# Patient Record
Sex: MALE | Race: WHITE | HISPANIC OR LATINO | URBAN - METROPOLITAN AREA
[De-identification: names, ages, dates, MRNs, and addresses within clinical notes are randomized per-mention and may not be internally consistent; named-entity substitution may affect disease eponyms.]

---

## 2018-02-03 ENCOUNTER — EMERGENCY (EMERGENCY)
Facility: HOSPITAL | Age: 33
LOS: 1 days | Discharge: HOME | End: 2018-02-03
Attending: EMERGENCY MEDICINE | Admitting: EMERGENCY MEDICINE
Payer: COMMERCIAL

## 2018-02-03 VITALS — DIASTOLIC BLOOD PRESSURE: 90 MMHG | SYSTOLIC BLOOD PRESSURE: 126 MMHG | HEART RATE: 80 BPM | OXYGEN SATURATION: 100 %

## 2018-02-03 VITALS
RESPIRATION RATE: 16 BRPM | OXYGEN SATURATION: 98 % | HEART RATE: 67 BPM | DIASTOLIC BLOOD PRESSURE: 71 MMHG | TEMPERATURE: 98 F | SYSTOLIC BLOOD PRESSURE: 129 MMHG

## 2018-02-03 PROCEDURE — 99284 EMERGENCY DEPT VISIT MOD MDM: CPT

## 2018-02-03 PROCEDURE — 99283 EMERGENCY DEPT VISIT LOW MDM: CPT

## 2018-02-03 RX ORDER — ACETAMINOPHEN 500 MG
650 TABLET ORAL ONCE
Qty: 0 | Refills: 0 | Status: COMPLETED | OUTPATIENT
Start: 2018-02-03 | End: 2018-02-03

## 2018-02-03 RX ORDER — DIAZEPAM 5 MG
5 TABLET ORAL ONCE
Qty: 0 | Refills: 0 | Status: DISCONTINUED | OUTPATIENT
Start: 2018-02-03 | End: 2018-02-03

## 2018-02-03 RX ORDER — DIAZEPAM 5 MG
1 TABLET ORAL
Qty: 5 | Refills: 0 | OUTPATIENT
Start: 2018-02-03 | End: 2018-02-05

## 2018-02-03 RX ADMIN — Medication 650 MILLIGRAM(S): at 10:31

## 2018-02-03 RX ADMIN — Medication 5 MILLIGRAM(S): at 10:31

## 2018-02-03 NOTE — ED PROVIDER NOTE - NS ED ROS FT
See hpi CONST: see HPI  : See HPI  NEURO: see HPI  GI: see HPI  CV: HPI  RESP: see HPI  MSK: see HPI

## 2018-02-03 NOTE — ED PROVIDER NOTE - MUSCULOSKELETAL MINIMAL EXAM
normal range of motion/lower back paraspinal muscle spasm, no midline bony tendernss. Normal straight leg raise. Normal strength of dorsi and plantarflexion/atraumatic

## 2018-02-03 NOTE — ED ADULT NURSE NOTE - OBJECTIVE STATEMENT
31 y/o M, A&Ox4, enters ED w/ c/o lower back pain. Hx. sciatica. Pt. reports he was getting out of bed and felt a sharp pain. Pt. reports since then he is unable to ambulate without pain. Pt. reports pain is 10/10 when ambulating. Pt. reports pain is primarily in right lower back and radiates down right leg. Pt. states pain is sharp. Denies falls/trauma to the area but pt. does report heavy lifting at work. Pt. reports taking 800 mg of ibuprofen earlier w/o relief. Skin warm, dry and intact. Family at bedside. Safety and comfort provided. 31 y/o M, A&Ox4, enters ED w/ c/o lower back pain. Hx. sciatica. Pt. reports he was getting out of bed and felt a sharp pain. Pt. reports since then he is unable to ambulate without pain. Pt. reports pain is 10/10 when ambulating. Pt. reports pain is primarily in right lower back and radiates down right leg. Pt. states pain is sharp. Denies falls/trauma to the area but pt. does report heavy lifting at work. Pt.'s pain increased when bending knee. Pt. reports taking 800 mg of ibuprofen earlier w/o relief. Skin warm, dry and intact. Family at bedside. Safety and comfort provided.

## 2018-02-03 NOTE — ED PROVIDER NOTE - OBJECTIVE STATEMENT
33yo male pmh GERD, sciatica p/w back pain limiting ambulation. Started last night after standing up off couch. Worse with movement. Took 800mg of "something" at 745 this morning. C/o right sided sciatica. Denies fevers, chills, trauma, n/v/d, chest pain, dyspnea, cough, abdominal pain, dysuria, urinary or fecal incontinence, weight changes, night sweats, IVDU, cancer hx, saddle anesthesia, dysuria, hematuria.  PCP - Dr Polo  [facility] [address] [facility] 31yo male pmh GERD, sciatica p/w back pain limiting ambulation simialr to but worse than baseline sciatica. Started last night after standing up off couch. Worse with movement. Took 800mg of "something" at 745 this morning. Pain similar to historical right sided sciatica. Difficulty ambulating is due to pain but he is able to ambulate. Denies fevers, chills, trauma, n/v/d, chest pain, dyspnea, cough, abdominal pain, dysuria, urinary or fecal incontinence, weight changes, night sweats, IVDU, cancer hx, saddle anesthesia or other sensory changes, muscular weakness, dysuria, hematuria.  PCP - Dr Polo  [facility] [address] [facility] 33yo male pmh GERD, sciatica p/w back pain limiting ambulation similar to but worse than baseline sciatica. Started last night after standing up off couch. Worse with movement. Took 800mg of "something" at 745 this morning. Pain similar to historical right sided sciatica. Difficulty ambulating is due to pain but he is able to ambulate. Denies fevers, chills, trauma, n/v/d, chest pain, dyspnea, cough, abdominal pain, dysuria, urinary or fecal incontinence, weight changes, night sweats, IVDU, cancer hx, saddle anesthesia or other sensory changes, muscular weakness, dysuria, hematuria.  PCP - Dr Polo  [facility] [address] [facility]

## 2018-02-03 NOTE — ED ADULT NURSE REASSESSMENT NOTE - NS ED NURSE REASSESS COMMENT FT1
pt. reports significant relief of pain. Pt. able to ambulate and reports a remarkable difference in being able to ambulate. Pt. reports prior to  medication, he could not ambulate at all.

## 2018-02-03 NOTE — ED PROVIDER NOTE - ATTENDING CONTRIBUTION TO CARE
ATTENDING MD:  Donte PONCE, personally have seen and examined this patient.  I have discussed all aspects of care with the resident physician. Resident note reviewed and agree on plan of care and except where noted.  See HPI, PE, and MDM for details.     GEN: well appearing, mild distress from pain  HEENT: NCAT, mucus membranes are moist, oropharynx is within normal limits   CV: normal rate, regular rhythm, S1, S2, 2+ DP and PT bilaterally  RESP: lungs clear to auscultation bilaterally, equal breath sounds bilaterally  ABD: the abdomen is soft, nontender, and nondistended, there are no palpable masses or organomegaly   : no CVAT  MSK: no midline back tenderness, mild R>L muscualr tenderness, no appreciable spasm, negative straight leg raise bilaterally  NEURO: 5/5 strength in all extremities, sensation intact to light touch throughout trunk and extremities, 2+ patellar reflexes    MDM: sciatic type back pain after heavy lifting, no red flags. in contrast to earlier notes, pt HAS been able to ambualte, it just hurts. supportive care, reeval. no indication for imaging. will refer to spine center for f/u.

## 2018-02-03 NOTE — ED PROVIDER NOTE - PROGRESS NOTE DETAILS
Ambulating, pt feels better. Will prescribe valium and ibuprofen. Discussed return precautions. Provided orthopedic contact for follow up

## 2019-03-31 NOTE — ED PROVIDER NOTE - PRINCIPAL DIAGNOSIS
[facility] 
CARDIAC CATH Name:  Cecily Lo 
MR#:  [id number] :  1946 ACCOUNT #:  [de-identified] DATE OF SERVICE:  2019 PROCEDURE PERFORMED:  Cardiac catheterization. HISTORY:  This is a 79-year-old gentleman with multiple risk factors for coronary artery disease. He has been having chest pain suspicious for unstable angina pectoris. Noninvasive imaging showed evidence of three-vessel coronary artery calcification. A cardiac catheterization is recommended. SURGEON:  Desi Elena MD 
 
PROCEDURE:  Left heart catheterization with left ventriculography and coronary angiography was carried out from the right radial artery by modified Seldinger technique with a 6-Italian multipurpose catheter and 3.5XB. The patient tolerated the procedure well. FINDINGS:  The central aortic pressure was 120/70 mmHg. Left ventricular end-diastolic pressure was 12 mmHg. There is no gradient on pullback across the aortic valve. The overall left ventricular size is normal.  The wall motion is normal.  The ejection fraction is 65%. Coronary angiography reveals a normal left main. It divides into an LAD and left circumflex. The LAD and lcx are normal. 
 
The right coronary artery is normal 
 
Imp: 
 
Normal LV function Normal coronaries DICTATION ENDS HERE. Emi Schmidt MD 
 
 
[id number] 
D:  2019 18:31 T:  2019 18:32 
JOB #:  [id number] Acute bilateral low back pain with right-sided sciatica

## 2020-08-17 PROBLEM — M54.31 SCIATICA, RIGHT SIDE: Chronic | Status: ACTIVE | Noted: 2018-02-03

## 2020-08-17 PROBLEM — K21.9 GASTRO-ESOPHAGEAL REFLUX DISEASE WITHOUT ESOPHAGITIS: Chronic | Status: ACTIVE | Noted: 2018-02-03

## 2020-08-26 ENCOUNTER — NON-APPOINTMENT (OUTPATIENT)
Age: 35
End: 2020-08-26

## 2020-08-26 ENCOUNTER — APPOINTMENT (OUTPATIENT)
Dept: INTERNAL MEDICINE | Facility: CLINIC | Age: 35
End: 2020-08-26
Payer: COMMERCIAL

## 2020-08-26 VITALS
TEMPERATURE: 98 F | DIASTOLIC BLOOD PRESSURE: 86 MMHG | BODY MASS INDEX: 39.38 KG/M2 | HEIGHT: 69.5 IN | HEART RATE: 91 BPM | WEIGHT: 272 LBS | SYSTOLIC BLOOD PRESSURE: 145 MMHG

## 2020-08-26 DIAGNOSIS — K21.0 GASTRO-ESOPHAGEAL REFLUX DISEASE WITH ESOPHAGITIS: ICD-10-CM

## 2020-08-26 DIAGNOSIS — Z78.9 OTHER SPECIFIED HEALTH STATUS: ICD-10-CM

## 2020-08-26 DIAGNOSIS — R06.02 SHORTNESS OF BREATH: ICD-10-CM

## 2020-08-26 DIAGNOSIS — Z82.49 FAMILY HISTORY OF ISCHEMIC HEART DISEASE AND OTHER DISEASES OF THE CIRCULATORY SYSTEM: ICD-10-CM

## 2020-08-26 DIAGNOSIS — Z87.891 PERSONAL HISTORY OF NICOTINE DEPENDENCE: ICD-10-CM

## 2020-08-26 DIAGNOSIS — Z11.59 ENCOUNTER FOR SCREENING FOR OTHER VIRAL DISEASES: ICD-10-CM

## 2020-08-26 DIAGNOSIS — K58.9 IRRITABLE BOWEL SYNDROME W/OUT DIARRHEA: ICD-10-CM

## 2020-08-26 DIAGNOSIS — K21.9 GASTRO-ESOPHAGEAL REFLUX DISEASE W/OUT ESOPHAGITIS: ICD-10-CM

## 2020-08-26 PROCEDURE — 99385 PREV VISIT NEW AGE 18-39: CPT | Mod: 25

## 2020-08-26 PROCEDURE — 93000 ELECTROCARDIOGRAM COMPLETE: CPT

## 2020-08-26 PROCEDURE — 36415 COLL VENOUS BLD VENIPUNCTURE: CPT

## 2020-08-26 NOTE — HEALTH RISK ASSESSMENT
[] : No [Yes] : Yes [2 - 4 times a month (2 pts)] : 2-4 times a month (2 points) [Never (0 pts)] : Never (0 points) [3 or 4 (1 pt)] : 3 or 4  (1 point) [No] : In the past 12 months have you used drugs other than those required for medical reasons? No [0] : 2) Feeling down, depressed, or hopeless: Not at all (0) [YearQuit] : quit 2 months ago  [Audit-CScore] : 3 [de-identified] : better but works at night therefore sometimes not good  [de-identified] : starting to with wife.  rides bike and walking  [No Retinopathy] : No retinopathy [[id number]] : 0 [EyeExamDate] : 7/2020 [Employed] : employed [] :  [# Of Children ___] : has [unfilled] children [Sexually Active] : sexually active [High Risk Behavior] : no high risk behavior [Fully functional (bathing, dressing, toileting, transferring, walking, feeding)] : Fully functional (bathing, dressing, toileting, transferring, walking, feeding) [Fully functional (using the telephone, shopping, preparing meals, housekeeping, doing laundry, using] : Fully functional and needs no help or supervision to perform IADLs (using the telephone, shopping, preparing meals, housekeeping, doing laundry, using transportation, managing medications and managing finances) [Reports changes in hearing] : Reports no changes in hearing [Reports changes in vision] : Reports no changes in vision [Reports changes in dental health] : Reports no changes in dental health [FreeTextEntry3] : trying to conceive

## 2020-08-26 NOTE — ASSESSMENT
[FreeTextEntry1] : 34 year old  male with h/o obesity, fatty liver, former smoker, esophageal ulcers presents for initial annual exam.\par \par states recently has been feeling some chest pain and sob while playing sports.  never during his bike rides or walking.  resolves after several minutes of rest.  no radiation to left arm or neck.  no prior episodes in the past.  former smoker and decreased alcohol consumption.  ecg-normal.  given symptoms and increased CV risk advised to followup with cardiology for further evaluation, advised to hold off on strenuous exercise for now, if symptoms worsen to o directly to ED \par \par \par h/o esophageal ulcers in the past.  s/p EGD 2 years ago.  was on PPI for a while then stopped.  \par h/o IBS, better after diatary changes\par -gi referral \par -advised to decrease alcohol and dietary modifications\par \par Annual \par -Advised to get yearly Flu shot \par -Advised Yearly Eye exam with Ophthalmologist\par -Advised Yearly Dental exam\par -Educated of the importance of Healthy diet, such as Mediterranean Diet and Exercise, such as walking >20 minutes a day and increasing gradually as tolerated\par \par \par \par \par \par \par \par

## 2020-08-26 NOTE — PHYSICAL EXAM
[Normal] : normal rate, regular rhythm, normal S1 and S2 and no murmur heard [No Varicosities] : no varicosities [Pedal Pulses Present] : the pedal pulses are present [No Edema] : there was no peripheral edema [Soft] : abdomen soft [No Extremity Clubbing/Cyanosis] : no extremity clubbing/cyanosis [Non Tender] : non-tender [Non-distended] : non-distended [No Masses] : no abdominal mass palpated [Normal Bowel Sounds] : normal bowel sounds [Normal Posterior Cervical Nodes] : no posterior cervical lymphadenopathy [Normal Anterior Cervical Nodes] : no anterior cervical lymphadenopathy [No CVA Tenderness] : no CVA  tenderness [No Spinal Tenderness] : no spinal tenderness [No Joint Swelling] : no joint swelling [Grossly Normal Strength/Tone] : grossly normal strength/tone [No Rash] : no rash [No Focal Deficits] : no focal deficits [Coordination Grossly Intact] : coordination grossly intact [Normal Gait] : normal gait [de-identified] : overweight  [Normal Affect] : the affect was normal [Normal Mood] : the mood was normal

## 2020-08-26 NOTE — HISTORY OF PRESENT ILLNESS
[de-identified] : 34 year old  male presents for initial annual exam.\par \par states recently has been feeling some chest pain and sob while playing sports.  never during his bike rides or walking.  resolves after several minutes of rest.  no radiation to left arm or neck.  no prior episodes in the past \par \par h/o esophageal ulcers in the past.  s/p EGD 2 years ago.  was on PPI for a while then stopped.  \par h/o IBS, better after diatary changes\par \par former smoker and decreased alcohol consumption.  \par \par

## 2020-08-26 NOTE — REVIEW OF SYSTEMS
[Constipation] : constipation [Chest Pain] : chest pain [Diarrhea] : diarrhea [Negative] : Heme/Lymph [FreeTextEntry7] : bloating, cramping

## 2020-08-27 LAB
25(OH)D3 SERPL-MCNC: 22 NG/ML
ABO + RH PNL BLD: NORMAL
ALBUMIN SERPL ELPH-MCNC: 4.6 G/DL
ALP BLD-CCNC: 111 U/L
ALT SERPL-CCNC: 73 U/L
ANION GAP SERPL CALC-SCNC: 12 MMOL/L
APPEARANCE: CLEAR
AST SERPL-CCNC: 46 U/L
BACTERIA: NEGATIVE
BASOPHILS # BLD AUTO: 0.04 K/UL
BASOPHILS NFR BLD AUTO: 0.4 %
BILIRUB DIRECT SERPL-MCNC: 0.2 MG/DL
BILIRUB INDIRECT SERPL-MCNC: 0.6 MG/DL
BILIRUB SERPL-MCNC: 0.9 MG/DL
BILIRUBIN URINE: NEGATIVE
BLOOD URINE: NEGATIVE
BUN SERPL-MCNC: 17 MG/DL
CALCIUM SERPL-MCNC: 9.2 MG/DL
CHLORIDE SERPL-SCNC: 104 MMOL/L
CHOLEST SERPL-MCNC: 142 MG/DL
CHOLEST/HDLC SERPL: 4.5 RATIO
CO2 SERPL-SCNC: 20 MMOL/L
COLOR: NORMAL
CREAT SERPL-MCNC: 0.89 MG/DL
EOSINOPHIL # BLD AUTO: 0.15 K/UL
EOSINOPHIL NFR BLD AUTO: 1.5 %
ESTIMATED AVERAGE GLUCOSE: 258 MG/DL
FERRITIN SERPL-MCNC: 346 NG/ML
FOLATE SERPL-MCNC: 15.9 NG/ML
GLUCOSE QUALITATIVE U: ABNORMAL
GLUCOSE SERPL-MCNC: 338 MG/DL
HBA1C MFR BLD HPLC: 10.6 %
HCT VFR BLD CALC: 42.4 %
HDLC SERPL-MCNC: 32 MG/DL
HGB BLD-MCNC: 12.8 G/DL
HYALINE CASTS: 0 /LPF
IMM GRANULOCYTES NFR BLD AUTO: 0.6 %
IRON SATN MFR SERPL: 21 %
IRON SERPL-MCNC: 67 UG/DL
KETONES URINE: NORMAL
LDLC SERPL CALC-MCNC: 79 MG/DL
LEUKOCYTE ESTERASE URINE: NEGATIVE
LYMPHOCYTES # BLD AUTO: 2.62 K/UL
LYMPHOCYTES NFR BLD AUTO: 25.4 %
MAN DIFF?: NORMAL
MCHC RBC-ENTMCNC: 23.4 PG
MCHC RBC-ENTMCNC: 30.2 GM/DL
MCV RBC AUTO: 77.5 FL
MICROSCOPIC-UA: NORMAL
MONOCYTES # BLD AUTO: 0.78 K/UL
MONOCYTES NFR BLD AUTO: 7.6 %
NEUTROPHILS # BLD AUTO: 6.65 K/UL
NEUTROPHILS NFR BLD AUTO: 64.5 %
NITRITE URINE: NEGATIVE
PH URINE: 6
PLATELET # BLD AUTO: 244 K/UL
POTASSIUM SERPL-SCNC: 4.1 MMOL/L
PROT SERPL-MCNC: 7.1 G/DL
PROTEIN URINE: NEGATIVE
RBC # BLD: 5.47 M/UL
RBC # FLD: 15.5 %
RED BLOOD CELLS URINE: 1 /HPF
SARS-COV-2 IGG SERPL IA-ACNC: 3.82 AU/ML
SARS-COV-2 IGG SERPL QL IA: NEGATIVE
SAVE SPECIMEN: NORMAL
SODIUM SERPL-SCNC: 136 MMOL/L
SPECIFIC GRAVITY URINE: 1.04
SQUAMOUS EPITHELIAL CELLS: 0 /HPF
TIBC SERPL-MCNC: 313 UG/DL
TRIGL SERPL-MCNC: 158 MG/DL
TSH SERPL-ACNC: 1.46 UIU/ML
UIBC SERPL-MCNC: 246 UG/DL
UROBILINOGEN URINE: NORMAL
VIT B12 SERPL-MCNC: 660 PG/ML
WBC # FLD AUTO: 10.3 K/UL
WHITE BLOOD CELLS URINE: 1 /HPF

## 2020-08-28 ENCOUNTER — APPOINTMENT (OUTPATIENT)
Dept: INTERNAL MEDICINE | Facility: CLINIC | Age: 35
End: 2020-08-28
Payer: COMMERCIAL

## 2020-08-28 VITALS
WEIGHT: 272 LBS | HEIGHT: 69.5 IN | TEMPERATURE: 98.2 F | SYSTOLIC BLOOD PRESSURE: 118 MMHG | HEART RATE: 66 BPM | BODY MASS INDEX: 39.38 KG/M2 | DIASTOLIC BLOOD PRESSURE: 77 MMHG

## 2020-08-28 LAB — GLUCOSE BLDC GLUCOMTR-MCNC: 217

## 2020-08-28 PROCEDURE — 82962 GLUCOSE BLOOD TEST: CPT

## 2020-08-28 PROCEDURE — 99213 OFFICE O/P EST LOW 20 MIN: CPT | Mod: 25

## 2020-08-28 RX ORDER — ASCORBIC ACID 1000 MG
10 TABLET ORAL
Refills: 0 | Status: ACTIVE | COMMUNITY

## 2020-08-28 RX ORDER — CRISABOROLE 20 MG/G
2 OINTMENT TOPICAL
Qty: 60 | Refills: 0 | Status: ACTIVE | COMMUNITY
Start: 2020-08-25

## 2020-08-28 RX ORDER — BLOOD-GLUCOSE METER
KIT MISCELLANEOUS
Qty: 1 | Refills: 0 | Status: ACTIVE | COMMUNITY
Start: 2020-08-28 | End: 1900-01-01

## 2020-08-28 NOTE — PLAN
[FreeTextEntry1] : 34 year old obese male presents for follow-up of newly-dxed DM.  a1c-10.6\par discussed in length, nature of disease, need to get lower levels, and risks associated with elevated levels, including but not limited to premature death, cv event.  \par \par seen ophthalmologist last month- no retinopathy\par will schedule appt with cardio\par \par Diabetes Mellitus, uncontrolled\par -will start meds, risks and benefits of medication discussed in detail.\par -nutrition consult\par -advised will need to start checking BG with glucometer, also suggested freestyle sultana if insurance covers and patient preference\par -Gave patient education on what foods to eat and avoid, as well as exercises to incorporate into their daily routine.\par -Encourage healthy Mediterranean Diet and moderate exercise regularly\par -May visit ADA website for diet recommendations\par -Advised to check a fasting sugar level in the morning, 2 hours after ANY meal and prior to bedtime.\par  -Record levels and bring it to your next appointment along with glucometer to review\par When to call your Doctor:-Call your doctor if you have unexplained blood sugar of 200 mg/dl or greater for 2 days. -Call your doctor, if your blood sugar is higher than 300 mg/dl at any time.-Call your doctor, if your blood sugar is less than 70 mg/dl two times on the same day or three times within one week. Your doctor may want to adjust your medication. -If you have signs/symptoms of low blood sugar (hypoglycemia),and/or your blood sugar is less than 70 mg/dl you may choose one of the below treatment 3 glucose tablets or ½ glass (4 oz.) of apple juice or 1/2 glass (4 oz.) of clear regular soda \par \par -f/u in 2 weeks \par \par

## 2020-08-28 NOTE — HISTORY OF PRESENT ILLNESS
[Spouse] : spouse [de-identified] : 34 year old obese male presents for follow-up of newly-dxed DM.  a1c-10.6\par discussed in length, nature of disease, need to get lower levels, and risks associated with elevated levels, including but not limited to premature death, cv event.  \par \par seen ophthalmologist last month- no retinopathy\par will schedule appt with cardio

## 2020-08-28 NOTE — PHYSICAL EXAM
[Normal] : normal rate, regular rhythm, normal S1 and S2 and no murmur heard [Pedal Pulses Present] : the pedal pulses are present [No Edema] : there was no peripheral edema [Soft] : abdomen soft [Non Tender] : non-tender [Non-distended] : non-distended

## 2020-08-29 ENCOUNTER — TRANSCRIPTION ENCOUNTER (OUTPATIENT)
Age: 35
End: 2020-08-29

## 2020-09-01 ENCOUNTER — TRANSCRIPTION ENCOUNTER (OUTPATIENT)
Age: 35
End: 2020-09-01

## 2020-09-01 RX ORDER — METFORMIN HYDROCHLORIDE 1000 MG/1
1000 TABLET, COATED ORAL
Qty: 180 | Refills: 1 | Status: DISCONTINUED | COMMUNITY
Start: 2020-08-28 | End: 2020-09-01

## 2020-09-10 ENCOUNTER — APPOINTMENT (OUTPATIENT)
Dept: CARDIOLOGY | Facility: CLINIC | Age: 35
End: 2020-09-10
Payer: COMMERCIAL

## 2020-09-10 ENCOUNTER — NON-APPOINTMENT (OUTPATIENT)
Age: 35
End: 2020-09-10

## 2020-09-10 VITALS
WEIGHT: 272 LBS | HEART RATE: 76 BPM | OXYGEN SATURATION: 99 % | TEMPERATURE: 98 F | DIASTOLIC BLOOD PRESSURE: 76 MMHG | BODY MASS INDEX: 39.59 KG/M2 | SYSTOLIC BLOOD PRESSURE: 118 MMHG

## 2020-09-10 VITALS — SYSTOLIC BLOOD PRESSURE: 120 MMHG | DIASTOLIC BLOOD PRESSURE: 80 MMHG

## 2020-09-10 PROCEDURE — 99204 OFFICE O/P NEW MOD 45 MIN: CPT

## 2020-09-10 PROCEDURE — 93000 ELECTROCARDIOGRAM COMPLETE: CPT

## 2020-09-16 ENCOUNTER — APPOINTMENT (OUTPATIENT)
Dept: CARDIOLOGY | Facility: CLINIC | Age: 35
End: 2020-09-16
Payer: COMMERCIAL

## 2020-09-16 ENCOUNTER — APPOINTMENT (OUTPATIENT)
Dept: INTERNAL MEDICINE | Facility: CLINIC | Age: 35
End: 2020-09-16
Payer: COMMERCIAL

## 2020-09-16 VITALS
BODY MASS INDEX: 38.51 KG/M2 | SYSTOLIC BLOOD PRESSURE: 123 MMHG | HEART RATE: 62 BPM | WEIGHT: 266 LBS | TEMPERATURE: 98.2 F | DIASTOLIC BLOOD PRESSURE: 72 MMHG | HEIGHT: 69.5 IN

## 2020-09-16 LAB — GLUCOSE BLDC GLUCOMTR-MCNC: 146

## 2020-09-16 PROCEDURE — 82962 GLUCOSE BLOOD TEST: CPT

## 2020-09-16 PROCEDURE — 99214 OFFICE O/P EST MOD 30 MIN: CPT

## 2020-09-16 PROCEDURE — 93015 CV STRESS TEST SUPVJ I&R: CPT

## 2020-09-16 NOTE — ASSESSMENT
[FreeTextEntry1] : 34 year old obese male presents for follow-up of newly-dxed DM.  a1c-10.6\par discussed in length, nature of disease, need to get lower levels, and risks associated with elevated levels, including but not limited to premature death, cv event.  \par \par seen ophthalmologist last month- no retinopathy\par seen cardio, stress test scheduled for today \par \par BGM now -147, hasn’t eaten anything since last night \par \par DM, last a1c-10.  stopped taking metformin due to diarrhea for one day.  working to get ER approved \par +weight loss but has started a more ketosis diet with increased protein intake \par -advised to go on ADA website for dietary recs, meet with DM educator (calls when he is sleeping due to his schedule, has appt this friday)\par -advised to restart metformin, advised SE are usually short lived.  still working on PA\par -will follow-up with cardio \par -cont statin and acei\par \par h/o esophageal ulcers in the past.  s/p EGD 2 years ago.  was on PPI for a while then stopped.  \par h/o IBS, better after dietary changes\par -gi referral \par -advised to decrease alcohol and dietary modifications\par \par -f/u in 2 months \par \par \par \par \par \par \par \par \par \par

## 2020-09-16 NOTE — HISTORY OF PRESENT ILLNESS
[de-identified] : 34 year old obese male presents for follow-up of newly-dxed DM.  a1c-10.6\par discussed in length, nature of disease, need to get lower levels, and risks associated with elevated levels, including but not limited to premature death, cv event.  \par \par seen ophthalmologist last month- no retinopathy\par seen cardio, stress test scheduled for today \par \par stopped taking metformin due to diarrhea for one day.  working to get ER approved \par +weight loss but has started a more ketosis diet with increased protein intake \par \par POCT BGM now -147, hasn’t eaten anything since last night \par \par BG at home 130-180, rare >200s\par

## 2020-09-18 NOTE — DISCUSSION/SUMMARY
[FreeTextEntry1] : He is a 35-year-old with poorly controlled diabetes who presents with fairly typical chest pain without any acute ECG changes.  His risk is at least intermediate given his risk factors and presentation and I am considering imaging his coronary arteries either with CT angiography or with coronary angiography.\par I suggested that he undergo an exercise stress test to both reproduce his symptoms and see if there are any high risk ECG features that would make coronary angiography more appropriate as both a diagnostic and possibly therapeutic intervention.\par If no significant abnormalities are seen on exercise stress testing then coronary CT angiography would be enough to exclude severe atherosclerosis.\par Better control of his blood sugar and statin therapy will likely be beneficial as you have started.  I have given him aspirin in the interim.

## 2020-09-18 NOTE — HISTORY OF PRESENT ILLNESS
[FreeTextEntry1] : Dear Anders,\par Thank you for referring him for cardiovascular evaluation.  He is a 34-year-old with newly diagnosed diabetes mellitus and hemoglobin A1c of 10.6 who was noted to have exertional chest discomfort particular when exerting himself.\par He states that over the past few months whenever he walks up hills or bicycle rides he gets chest discomfort.\par The discomfort lasts until he is done bicycle riding or slows down.  There are no other associated symptoms.\par He has no known history of coronary artery disease, hypertension, hypercholesterolemia or current smoking.\par He does have a history of esophageal ulcers.

## 2020-09-29 ENCOUNTER — APPOINTMENT (OUTPATIENT)
Dept: CARDIOLOGY | Facility: CLINIC | Age: 35
End: 2020-09-29
Payer: COMMERCIAL

## 2020-09-29 PROCEDURE — 93306 TTE W/DOPPLER COMPLETE: CPT

## 2020-09-30 ENCOUNTER — RESULT REVIEW (OUTPATIENT)
Age: 35
End: 2020-09-30

## 2020-09-30 ENCOUNTER — APPOINTMENT (OUTPATIENT)
Dept: CT IMAGING | Facility: CLINIC | Age: 35
End: 2020-09-30

## 2020-09-30 ENCOUNTER — OUTPATIENT (OUTPATIENT)
Dept: OUTPATIENT SERVICES | Facility: HOSPITAL | Age: 35
LOS: 1 days | End: 2020-09-30
Payer: SELF-PAY

## 2020-09-30 DIAGNOSIS — Z00.8 ENCOUNTER FOR OTHER GENERAL EXAMINATION: ICD-10-CM

## 2020-09-30 PROCEDURE — 75571 CT HRT W/O DYE W/CA TEST: CPT | Mod: 26

## 2020-09-30 PROCEDURE — 75571 CT HRT W/O DYE W/CA TEST: CPT

## 2020-10-02 ENCOUNTER — APPOINTMENT (OUTPATIENT)
Dept: CHRONIC DISEASE MANAGEMENT | Facility: CLINIC | Age: 35
End: 2020-10-02

## 2020-10-02 VITALS — HEIGHT: 69.5 IN | WEIGHT: 262 LBS | BODY MASS INDEX: 37.93 KG/M2

## 2020-10-02 RX ORDER — METFORMIN HYDROCHLORIDE 500 MG/1
500 TABLET, EXTENDED RELEASE ORAL
Qty: 180 | Refills: 0 | Status: DISCONTINUED | COMMUNITY
Start: 2020-09-01 | End: 2020-10-02

## 2020-11-18 ENCOUNTER — APPOINTMENT (OUTPATIENT)
Dept: INTERNAL MEDICINE | Facility: CLINIC | Age: 35
End: 2020-11-18

## 2021-10-11 ENCOUNTER — APPOINTMENT (OUTPATIENT)
Dept: INTERNAL MEDICINE | Facility: CLINIC | Age: 36
End: 2021-10-11
Payer: COMMERCIAL

## 2021-10-11 ENCOUNTER — NON-APPOINTMENT (OUTPATIENT)
Age: 36
End: 2021-10-11

## 2021-10-11 VITALS
TEMPERATURE: 97 F | HEIGHT: 69.5 IN | SYSTOLIC BLOOD PRESSURE: 134 MMHG | OXYGEN SATURATION: 97 % | DIASTOLIC BLOOD PRESSURE: 82 MMHG | HEART RATE: 80 BPM

## 2021-10-11 DIAGNOSIS — Z23 ENCOUNTER FOR IMMUNIZATION: ICD-10-CM

## 2021-10-11 DIAGNOSIS — Z00.00 ENCOUNTER FOR GENERAL ADULT MEDICAL EXAMINATION W/OUT ABNORMAL FINDINGS: ICD-10-CM

## 2021-10-11 DIAGNOSIS — K76.0 FATTY (CHANGE OF) LIVER, NOT ELSEWHERE CLASSIFIED: ICD-10-CM

## 2021-10-11 LAB — HBA1C MFR BLD HPLC: 11.7

## 2021-10-11 PROCEDURE — 90686 IIV4 VACC NO PRSV 0.5 ML IM: CPT

## 2021-10-11 PROCEDURE — G0008: CPT

## 2021-10-11 PROCEDURE — 99395 PREV VISIT EST AGE 18-39: CPT | Mod: 25

## 2021-10-11 PROCEDURE — 93000 ELECTROCARDIOGRAM COMPLETE: CPT

## 2021-10-11 PROCEDURE — 83036 HEMOGLOBIN GLYCOSYLATED A1C: CPT | Mod: QW

## 2021-10-11 PROCEDURE — 36415 COLL VENOUS BLD VENIPUNCTURE: CPT

## 2021-10-11 RX ORDER — ATORVASTATIN CALCIUM 20 MG/1
20 TABLET, FILM COATED ORAL
Qty: 90 | Refills: 1 | Status: ACTIVE | COMMUNITY
Start: 2020-08-28 | End: 1900-01-01

## 2021-10-11 RX ORDER — LISINOPRIL 2.5 MG/1
2.5 TABLET ORAL DAILY
Qty: 90 | Refills: 3 | Status: ACTIVE | COMMUNITY
Start: 2020-08-28 | End: 1900-01-01

## 2021-10-11 NOTE — HEALTH RISK ASSESSMENT
[Good] : ~his/her~  mood as  good [No] : No [No falls in past year] : Patient reported no falls in the past year [0] : 2) Feeling down, depressed, or hopeless: Not at all (0) [No Retinopathy] : No retinopathy [] : No [[id number]] : 0 [EyeExamDate] : 2020

## 2021-10-11 NOTE — HISTORY OF PRESENT ILLNESS
[de-identified] : 36 year old male with h/o uncontrolled DM, obesity, fatty liver  presents for annual exam.  hasn’t been seen since last year\par \par seen by cardio last year, stress, echo and CT calcium score-unremarkable \par \par Diabetes Mellitus, POCT A1c-11.7, has been off meds for the last 2 months \par BGM-hasnt been checking \par Ophthalmologist - 2020\par \par poor diet and not exercising recently.  increased alcohol use over last several months.  \par \par employed-  \par

## 2021-10-11 NOTE — PHYSICAL EXAM
[Normal] : normal rate, regular rhythm, normal S1 and S2 and no murmur heard [No Varicosities] : no varicosities [Pedal Pulses Present] : the pedal pulses are present [No Edema] : there was no peripheral edema [No Extremity Clubbing/Cyanosis] : no extremity clubbing/cyanosis [Soft] : abdomen soft [Non Tender] : non-tender [Non-distended] : non-distended [Comprehensive Foot Exam Normal] : Right and left foot were examined and both feet are normal. No ulcers in either foot. Toes are normal and with full ROM.  Normal tactile sensation with monofilament testing throughout both feet

## 2021-10-11 NOTE — ASSESSMENT
[FreeTextEntry1] : 36 year old male with h/o uncontrolled DM, obesity, fatty liver  presents for annual exam.  hasn’t been seen since last year\par seen by cardio last year, stress, echo and CT calcium score-unremarkable \par poor diet and not exercising recently.  increased alcohol use over last several months.  \par employed-  \par \par \par uncontrolled Diabetes Mellitus, POCT A1c-11.7, has been off meds for the last 2 months \par BGM-hasnt been checking \par Ophthalmologist - 2020\par -will restart meds, including metformin, will need to add but will titrate upwards as has been resistent in the past\par -atorvastatin for HLD \par -lisinopril for nephro protection \par -advised needs to decrease alcohol intake \par -Gave patient education on what foods to eat and avoid, as well as exercises to incorporate into their daily routine.\par -Encourage healthy Mediterranean Diet and moderate exercise regularly\par -May visit ADA website for diet recommendations\par -Advised to check a fasting sugar level in the morning, 2 hours after ANY meal and prior to bedtime.\par  -Record levels and bring it to your next appointment along with glucometer to review\par When to call your Doctor:-Call your doctor if you have unexplained blood sugar of 200 mg/dl or greater for 2 days. -Call your doctor, if your blood sugar is higher than 300 mg/dl at any time.-Call your doctor, if your blood sugar is less than 70 mg/dl two times on the same day or three times within one week. Your doctor may want to adjust your medication. -If you have signs/symptoms of low blood sugar (hypoglycemia),and/or your blood sugar is less than 70 mg/dl you may choose one of the below treatment 3 glucose tablets or ½ glass (4 oz.) of apple juice or 1/2 glass (4 oz.) of clear regular soda \par -Advised to follow-up with Ophthalmologist annually for diabetic retinopathy screening -needs to see this year\par -f/u in 1 month with readings \par \par h/o esophageal ulcers in the past.  s/p EGD 2 years ago.  was on PPI for a while then stopped.  +alcohol use \par h/o IBS, better after dietary changes\par -gi referral \par -advised to decrease alcohol and dietary modifications\par \par Annual \par -Advised to get yearly Flu shot -given today \par -Advised Yearly Eye exam with Ophthalmologist\par -Advised Yearly Dental exam\par -Educated of the importance of Healthy diet, such as Mediterranean Diet and Exercise, such as walking >20 minutes a day and increasing gradually as tolerated\par \par -covid -UTD\par \par \par -f/u in 1 month\par \par \par \par \par \par \par \par \par \par

## 2021-10-12 LAB
25(OH)D3 SERPL-MCNC: 20.4 NG/ML
ALBUMIN SERPL ELPH-MCNC: 4.3 G/DL
ALP BLD-CCNC: 116 U/L
ALT SERPL-CCNC: 54 U/L
ANION GAP SERPL CALC-SCNC: 12 MMOL/L
APPEARANCE: CLEAR
AST SERPL-CCNC: 28 U/L
BACTERIA: NEGATIVE
BASOPHILS # BLD AUTO: 0.05 K/UL
BASOPHILS NFR BLD AUTO: 0.5 %
BILIRUB DIRECT SERPL-MCNC: 0.1 MG/DL
BILIRUB INDIRECT SERPL-MCNC: 0.3 MG/DL
BILIRUB SERPL-MCNC: 0.4 MG/DL
BILIRUBIN URINE: NEGATIVE
BLOOD URINE: NEGATIVE
BUN SERPL-MCNC: 11 MG/DL
CALCIUM SERPL-MCNC: 9.3 MG/DL
CHLORIDE SERPL-SCNC: 104 MMOL/L
CHOLEST SERPL-MCNC: 143 MG/DL
CO2 SERPL-SCNC: 22 MMOL/L
COLOR: NORMAL
CREAT SERPL-MCNC: 0.88 MG/DL
CREAT SPEC-SCNC: 144 MG/DL
EOSINOPHIL # BLD AUTO: 0.1 K/UL
EOSINOPHIL NFR BLD AUTO: 1 %
ESTIMATED AVERAGE GLUCOSE: 269 MG/DL
FERRITIN SERPL-MCNC: 244 NG/ML
FOLATE SERPL-MCNC: 12.3 NG/ML
GLUCOSE QUALITATIVE U: ABNORMAL
GLUCOSE SERPL-MCNC: 322 MG/DL
HBA1C MFR BLD HPLC: 11 %
HCT VFR BLD CALC: 43.4 %
HDLC SERPL-MCNC: 33 MG/DL
HGB BLD-MCNC: 13 G/DL
HYALINE CASTS: 0 /LPF
IMM GRANULOCYTES NFR BLD AUTO: 0.5 %
IRON SATN MFR SERPL: 26 %
IRON SERPL-MCNC: 76 UG/DL
KETONES URINE: NORMAL
LDLC SERPL CALC-MCNC: 68 MG/DL
LEUKOCYTE ESTERASE URINE: NEGATIVE
LYMPHOCYTES # BLD AUTO: 3.06 K/UL
LYMPHOCYTES NFR BLD AUTO: 31.7 %
MAN DIFF?: NORMAL
MCHC RBC-ENTMCNC: 23.3 PG
MCHC RBC-ENTMCNC: 30 GM/DL
MCV RBC AUTO: 77.9 FL
MICROALBUMIN 24H UR DL<=1MG/L-MCNC: <1.2 MG/DL
MICROALBUMIN/CREAT 24H UR-RTO: NORMAL MG/G
MICROSCOPIC-UA: NORMAL
MONOCYTES # BLD AUTO: 0.72 K/UL
MONOCYTES NFR BLD AUTO: 7.5 %
NEUTROPHILS # BLD AUTO: 5.67 K/UL
NEUTROPHILS NFR BLD AUTO: 58.8 %
NITRITE URINE: NEGATIVE
NONHDLC SERPL-MCNC: 109 MG/DL
PH URINE: 6
PLATELET # BLD AUTO: 240 K/UL
POTASSIUM SERPL-SCNC: 4.1 MMOL/L
PROT SERPL-MCNC: 6.9 G/DL
PROTEIN URINE: NEGATIVE
RBC # BLD: 5.57 M/UL
RBC # FLD: 15.6 %
RED BLOOD CELLS URINE: 2 /HPF
SODIUM SERPL-SCNC: 138 MMOL/L
SPECIFIC GRAVITY URINE: 1.04
SQUAMOUS EPITHELIAL CELLS: 0 /HPF
TIBC SERPL-MCNC: 296 UG/DL
TRIGL SERPL-MCNC: 207 MG/DL
TSH SERPL-ACNC: 1.56 UIU/ML
UIBC SERPL-MCNC: 220 UG/DL
UROBILINOGEN URINE: NORMAL
VIT B12 SERPL-MCNC: 667 PG/ML
WBC # FLD AUTO: 9.65 K/UL
WHITE BLOOD CELLS URINE: 1 /HPF

## 2022-01-31 ENCOUNTER — APPOINTMENT (OUTPATIENT)
Dept: CARDIOLOGY | Facility: CLINIC | Age: 37
End: 2022-01-31

## 2022-02-04 ENCOUNTER — APPOINTMENT (OUTPATIENT)
Dept: CARDIOLOGY | Facility: CLINIC | Age: 37
End: 2022-02-04

## 2022-02-07 ENCOUNTER — APPOINTMENT (OUTPATIENT)
Dept: CARDIOLOGY | Facility: CLINIC | Age: 37
End: 2022-02-07
Payer: COMMERCIAL

## 2022-02-07 ENCOUNTER — NON-APPOINTMENT (OUTPATIENT)
Age: 37
End: 2022-02-07

## 2022-02-07 VITALS
HEIGHT: 69.5 IN | OXYGEN SATURATION: 96 % | WEIGHT: 273 LBS | DIASTOLIC BLOOD PRESSURE: 80 MMHG | HEART RATE: 103 BPM | BODY MASS INDEX: 39.53 KG/M2 | SYSTOLIC BLOOD PRESSURE: 132 MMHG

## 2022-02-07 DIAGNOSIS — E11.9 TYPE 2 DIABETES MELLITUS W/OUT COMPLICATIONS: ICD-10-CM

## 2022-02-07 DIAGNOSIS — R07.9 CHEST PAIN, UNSPECIFIED: ICD-10-CM

## 2022-02-07 PROCEDURE — 93000 ELECTROCARDIOGRAM COMPLETE: CPT

## 2022-02-07 PROCEDURE — 99214 OFFICE O/P EST MOD 30 MIN: CPT

## 2022-02-07 RX ORDER — PANTOPRAZOLE 40 MG/1
40 TABLET, DELAYED RELEASE ORAL
Qty: 90 | Refills: 1 | Status: ACTIVE | COMMUNITY
Start: 2022-02-07 | End: 1900-01-01

## 2022-02-13 NOTE — DISCUSSION/SUMMARY
[FreeTextEntry1] : He is a 36-year-old with poorly controlled diabetes, obesity and gastroesophageal reflux symptoms. \par \par In September 2020 he had a normal exercise stress test. \par His echocardiogram revealed normal left ventricular systolic function. \par He also had a coronary calcium scan with a calculated Agatston score of 0.\par His diabetes continues to be poorly controlled with a most recent A1c of 11.7%.\par We discussed the extreme importance in avoiding any type of fad dieting due to lack of sustainability toward weight loss goals and increased risk for negative outcomes.\par He will schedule an appointment to meet with our dietitian for further guidance and has been encouraged to assemble and use his new rowing machine for exercise with a goal of at least 20 minutes three times a week. PPI therapy should be helpful in suppressing his GI related chest discomfort, although dietary modifications are also necessary.  \par His chest pain does not appear anginal.\par Follow up in 3 months, or sooner if necessary. \par

## 2022-02-13 NOTE — HISTORY OF PRESENT ILLNESS
[FreeTextEntry1] : He has been feeling a gas-type chest sensation that occurs at random and lasts for seconds. \par Last time he felt it was a week ago. Occurred at rest while he was driving. No lightheadedness.\par He has just bought a row machine which is not assembled as of yet. \par He wants to start a keto diet for weight loss. \par Up and down stairs for work, runs out of breath on 5th flight of stairs. \par He also reports frequent use of Tums.\par \par \par Prior:\par He is a 34-year-old with newly diagnosed diabetes mellitus and hemoglobin A1c of 10.6 who was noted to have exertional chest discomfort particular when exerting himself.\par He states that over the past few months whenever he walks up hills or bicycle rides he gets chest discomfort.\par The discomfort lasts until he is done bicycle riding or slows down.  There are no other associated symptoms.\par He has no known history of coronary artery disease, hypertension, hypercholesterolemia or current smoking.\par He does have a history of esophageal ulcers.

## 2022-02-17 ENCOUNTER — APPOINTMENT (OUTPATIENT)
Dept: CARDIOLOGY | Facility: CLINIC | Age: 37
End: 2022-02-17

## 2022-04-11 PROBLEM — Z11.59 SCREENING FOR VIRAL DISEASE: Status: ACTIVE | Noted: 2020-08-26

## 2022-05-09 ENCOUNTER — APPOINTMENT (OUTPATIENT)
Dept: CARDIOLOGY | Facility: CLINIC | Age: 37
End: 2022-05-09

## 2023-02-13 RX ORDER — METFORMIN ER 500 MG 500 MG/1
500 TABLET ORAL
Qty: 360 | Refills: 0 | Status: ACTIVE | COMMUNITY
Start: 2020-09-16 | End: 1900-01-01

## 2023-04-10 ENCOUNTER — RX RENEWAL (OUTPATIENT)
Age: 38
End: 2023-04-10

## 2023-04-11 RX ORDER — LANCETS 33 GAUGE
EACH MISCELLANEOUS
Qty: 180 | Refills: 0 | Status: ACTIVE | COMMUNITY
Start: 2020-08-28 | End: 1900-01-01

## 2023-04-11 RX ORDER — BLOOD SUGAR DIAGNOSTIC
STRIP MISCELLANEOUS
Qty: 180 | Refills: 0 | Status: ACTIVE | COMMUNITY
Start: 2020-08-28 | End: 1900-01-01

## 2023-04-17 ENCOUNTER — APPOINTMENT (OUTPATIENT)
Dept: INTERNAL MEDICINE | Facility: CLINIC | Age: 38
End: 2023-04-17

## 2024-06-17 NOTE — ED PROVIDER NOTE - MEDICAL DECISION MAKING DETAILS
Detail Level: Zone
Detail Level: Detailed
32M with back pain after standing, muscle spasm. Normal straight leg raise. No risk factors or findings concerning for epidural abscess, diskitis, vertebral osteo, cord compression, cauda equina, vertebral fracture or jacek malignancy, AAA, or pyelonephritis.  Patient instructed to consider further imaging and workup through their primary care physician as an outpatient, if symptoms persist.

## 2024-10-30 ENCOUNTER — APPOINTMENT (OUTPATIENT)
Dept: INTERNAL MEDICINE | Facility: CLINIC | Age: 39
End: 2024-10-30
Payer: COMMERCIAL

## 2024-10-30 ENCOUNTER — NON-APPOINTMENT (OUTPATIENT)
Age: 39
End: 2024-10-30

## 2024-10-30 VITALS
HEIGHT: 69.5 IN | HEART RATE: 87 BPM | BODY MASS INDEX: 39.82 KG/M2 | OXYGEN SATURATION: 97 % | WEIGHT: 275 LBS | DIASTOLIC BLOOD PRESSURE: 80 MMHG | TEMPERATURE: 97.5 F | SYSTOLIC BLOOD PRESSURE: 126 MMHG

## 2024-10-30 DIAGNOSIS — Z00.00 ENCOUNTER FOR GENERAL ADULT MEDICAL EXAMINATION W/OUT ABNORMAL FINDINGS: ICD-10-CM

## 2024-10-30 PROCEDURE — 36415 COLL VENOUS BLD VENIPUNCTURE: CPT

## 2024-10-30 PROCEDURE — 93000 ELECTROCARDIOGRAM COMPLETE: CPT

## 2024-10-30 PROCEDURE — 99386 PREV VISIT NEW AGE 40-64: CPT

## 2024-11-06 DIAGNOSIS — E11.9 TYPE 2 DIABETES MELLITUS W/OUT COMPLICATIONS: ICD-10-CM

## 2024-11-06 LAB
25(OH)D3 SERPL-MCNC: 19.8 NG/ML
ALBUMIN SERPL ELPH-MCNC: 4.5 G/DL
ALP BLD-CCNC: 116 U/L
ALT SERPL-CCNC: 44 U/L
ANION GAP SERPL CALC-SCNC: 13 MMOL/L
APPEARANCE: CLEAR
AST SERPL-CCNC: 24 U/L
BACTERIA: NEGATIVE /HPF
BASOPHILS # BLD AUTO: 0.05 K/UL
BASOPHILS NFR BLD AUTO: 0.5 %
BILIRUB SERPL-MCNC: 0.6 MG/DL
BILIRUBIN URINE: NEGATIVE
BLOOD URINE: NEGATIVE
BUN SERPL-MCNC: 14 MG/DL
CALCIUM SERPL-MCNC: 9.4 MG/DL
CAST: 0 /LPF
CHLORIDE SERPL-SCNC: 104 MMOL/L
CHOLEST SERPL-MCNC: 160 MG/DL
CO2 SERPL-SCNC: 21 MMOL/L
COLOR: YELLOW
CREAT SERPL-MCNC: 0.77 MG/DL
EGFR: 116 ML/MIN/1.73M2
EOSINOPHIL # BLD AUTO: 0.19 K/UL
EOSINOPHIL NFR BLD AUTO: 1.9 %
EPITHELIAL CELLS: 0 /HPF
ESTIMATED AVERAGE GLUCOSE: 309 MG/DL
GLUCOSE QUALITATIVE U: >=1000 MG/DL
GLUCOSE SERPL-MCNC: 337 MG/DL
HBA1C MFR BLD HPLC: 12.4 %
HCT VFR BLD CALC: 41.3 %
HDLC SERPL-MCNC: 33 MG/DL
HGB BLD-MCNC: 13.1 G/DL
IMM GRANULOCYTES NFR BLD AUTO: 0.6 %
KETONES URINE: NEGATIVE MG/DL
LDLC SERPL CALC-MCNC: 89 MG/DL
LEUKOCYTE ESTERASE URINE: NEGATIVE
LYMPHOCYTES # BLD AUTO: 2.82 K/UL
LYMPHOCYTES NFR BLD AUTO: 28.2 %
MAN DIFF?: NORMAL
MCHC RBC-ENTMCNC: 23.3 PG
MCHC RBC-ENTMCNC: 31.7 G/DL
MCV RBC AUTO: 73.4 FL
MICROSCOPIC-UA: NORMAL
MONOCYTES # BLD AUTO: 0.75 K/UL
MONOCYTES NFR BLD AUTO: 7.5 %
NEUTROPHILS # BLD AUTO: 6.14 K/UL
NEUTROPHILS NFR BLD AUTO: 61.3 %
NITRITE URINE: NEGATIVE
NONHDLC SERPL-MCNC: 127 MG/DL
PH URINE: 5.5
PLATELET # BLD AUTO: 260 K/UL
POTASSIUM SERPL-SCNC: 4.1 MMOL/L
PROT SERPL-MCNC: 7.3 G/DL
PROTEIN URINE: NEGATIVE MG/DL
PSA SERPL-MCNC: 0.42 NG/ML
RBC # BLD: 5.63 M/UL
RBC # FLD: 15.1 %
RED BLOOD CELLS URINE: 0 /HPF
SODIUM SERPL-SCNC: 139 MMOL/L
SPECIFIC GRAVITY URINE: >1.03
T4 SERPL-MCNC: 6.7 UG/DL
TRIGL SERPL-MCNC: 220 MG/DL
TSH SERPL-ACNC: 1.06 UIU/ML
URATE SERPL-MCNC: 2.9 MG/DL
UROBILINOGEN URINE: 0.2 MG/DL
WBC # FLD AUTO: 10.01 K/UL
WHITE BLOOD CELLS URINE: 0 /HPF

## 2024-11-06 RX ORDER — METFORMIN ER 500 MG 500 MG/1
500 TABLET ORAL
Qty: 180 | Refills: 0 | Status: ACTIVE | COMMUNITY
Start: 2024-11-06 | End: 1900-01-01

## 2024-12-09 ENCOUNTER — APPOINTMENT (OUTPATIENT)
Dept: ALLERGY | Facility: CLINIC | Age: 39
End: 2024-12-09

## 2024-12-09 ENCOUNTER — APPOINTMENT (OUTPATIENT)
Dept: INTERNAL MEDICINE | Facility: CLINIC | Age: 39
End: 2024-12-09

## 2025-01-20 ENCOUNTER — NON-APPOINTMENT (OUTPATIENT)
Age: 40
End: 2025-01-20

## 2025-01-20 ENCOUNTER — LABORATORY RESULT (OUTPATIENT)
Age: 40
End: 2025-01-20

## 2025-01-20 ENCOUNTER — APPOINTMENT (OUTPATIENT)
Dept: ALLERGY | Facility: CLINIC | Age: 40
End: 2025-01-20
Payer: COMMERCIAL

## 2025-01-20 VITALS
BODY MASS INDEX: 39.84 KG/M2 | DIASTOLIC BLOOD PRESSURE: 72 MMHG | SYSTOLIC BLOOD PRESSURE: 130 MMHG | TEMPERATURE: 98.5 F | HEART RATE: 96 BPM | HEIGHT: 69 IN | OXYGEN SATURATION: 99 % | WEIGHT: 269 LBS

## 2025-01-20 DIAGNOSIS — J30.1 ALLERGIC RHINITIS DUE TO POLLEN: ICD-10-CM

## 2025-01-20 PROCEDURE — 95004 PERQ TESTS W/ALRGNC XTRCS: CPT

## 2025-01-20 PROCEDURE — 99204 OFFICE O/P NEW MOD 45 MIN: CPT | Mod: 25

## 2025-01-22 LAB
DEPRECATED MUSHROOM IGE RAST QL: 0
MUSHROOM IGE QN: <0.1 KUA/L

## 2025-01-24 ENCOUNTER — NON-APPOINTMENT (OUTPATIENT)
Age: 40
End: 2025-01-24

## 2025-07-30 ENCOUNTER — RX RENEWAL (OUTPATIENT)
Age: 40
End: 2025-07-30